# Patient Record
Sex: MALE | URBAN - METROPOLITAN AREA
[De-identification: names, ages, dates, MRNs, and addresses within clinical notes are randomized per-mention and may not be internally consistent; named-entity substitution may affect disease eponyms.]

---

## 2020-10-05 ENCOUNTER — NURSE TRIAGE (OUTPATIENT)
Dept: NURSING | Facility: CLINIC | Age: 55
End: 2020-10-05

## 2020-10-06 NOTE — TELEPHONE ENCOUNTER
"    Call  To [facility] through [facility]; is from [place] near [facility] and is followed by [facility]  Caller reports that he has\" every single symptoms of Covid known to mankind\"   Symptoms present for > 10 days;   tested  5 days  go;    PCR was tested inconclusive; is on immunosupressants  States he  Is still running fever s > 101 today; did have 24 hrs without fever yesterday .  Has dry cough, chills, body aches;  Needs to have Cpap n order to sleep; tried without and  increased apnea   Caller has been using an albuterol inhaler that was given to him  awhile ago but has never been diagnosed with asthma or COPD  Caller is able to speak with a full voice and long sentences but states he has  fatigue and weakness  Has chest  pressure intermittently  Triage  protocol reviewed   Caller is advised to contact on call provider and if unable to reach, to proceed to ED for evaluation for worsening symptoms  Of Covid-19   Caller understands and will comply   Lary Anand RN  [facility]      Reason for Disposition    [1] Fever > 101 F (38.3 C) AND [2] age > 60    HIGH RISK patient (e.g., age > 64 years, diabetes, heart or lung disease, weak immune system) (Exception: Has already been evaluated by healthcare provider and has no new or worsening symptoms)    [1] Fever returns after gone for over 24 hours AND [2] symptoms worse or not improved    Additional Information    Negative: SEVERE difficulty breathing (e.g., struggling for each breath, speaks in single words)    Negative: Difficult to awaken or acting confused (e.g., disoriented, slurred speech)    Negative: Bluish (or gray) lips or face now    Negative: Shock suspected (e.g., cold/pale/clammy skin, too weak to stand, low BP, rapid pulse)    Negative: Sounds like a life-threatening emergency to the triager    Negative: [1] COVID-19 exposure AND [2] no symptoms    Negative: COVID-19 and Breastfeeding, questions about    Negative: [1] Adult with possible COVID-19 " symptoms AND [2] triager concerned about severity of symptoms or other causes    Negative: SEVERE or constant chest pain or pressure (Exception: mild central chest pain, present only when coughing)    Negative: MODERATE difficulty breathing (e.g., speaks in phrases, SOB even at rest, pulse 100-120)    Negative: Patient sounds very sick or weak to the triager    Protocols used: CORONAVIRUS (COVID-19) DIAGNOSED OR [id number] 8.4.20